# Patient Record
Sex: MALE | Race: WHITE | NOT HISPANIC OR LATINO | Employment: OTHER | URBAN - METROPOLITAN AREA
[De-identification: names, ages, dates, MRNs, and addresses within clinical notes are randomized per-mention and may not be internally consistent; named-entity substitution may affect disease eponyms.]

---

## 2019-08-28 ENCOUNTER — OFFICE VISIT (OUTPATIENT)
Dept: SURGERY | Facility: CLINIC | Age: 54
End: 2019-08-28
Payer: MEDICARE

## 2019-08-28 VITALS
BODY MASS INDEX: 37.37 KG/M2 | DIASTOLIC BLOOD PRESSURE: 100 MMHG | SYSTOLIC BLOOD PRESSURE: 135 MMHG | HEART RATE: 65 BPM | HEIGHT: 73 IN | WEIGHT: 282 LBS

## 2019-08-28 DIAGNOSIS — R15.1 FECAL SMEARING: ICD-10-CM

## 2019-08-28 DIAGNOSIS — R15.0 INCOMPLETE DEFECATION: ICD-10-CM

## 2019-08-28 PROCEDURE — 99999 PR PBB SHADOW E&M-NEW PATIENT-LVL IV: ICD-10-PCS | Mod: PBBFAC,,, | Performed by: COLON & RECTAL SURGERY

## 2019-08-28 PROCEDURE — 46600 DIAGNOSTIC ANOSCOPY SPX: CPT | Mod: 25,PBBFAC | Performed by: COLON & RECTAL SURGERY

## 2019-08-28 PROCEDURE — 99204 OFFICE O/P NEW MOD 45 MIN: CPT | Mod: PBBFAC,25 | Performed by: COLON & RECTAL SURGERY

## 2019-08-28 PROCEDURE — 99203 OFFICE O/P NEW LOW 30 MIN: CPT | Mod: S$PBB,25,, | Performed by: COLON & RECTAL SURGERY

## 2019-08-28 PROCEDURE — 46600 DIAGNOSTIC ANOSCOPY SPX: CPT | Mod: S$PBB,,, | Performed by: COLON & RECTAL SURGERY

## 2019-08-28 PROCEDURE — 99203 PR OFFICE/OUTPT VISIT, NEW, LEVL III, 30-44 MIN: ICD-10-PCS | Mod: S$PBB,25,, | Performed by: COLON & RECTAL SURGERY

## 2019-08-28 PROCEDURE — 99999 PR PBB SHADOW E&M-NEW PATIENT-LVL IV: CPT | Mod: PBBFAC,,, | Performed by: COLON & RECTAL SURGERY

## 2019-08-28 PROCEDURE — 46600 PR DIAG2STIC A2SCOPY: ICD-10-PCS | Mod: S$PBB,,, | Performed by: COLON & RECTAL SURGERY

## 2019-08-28 RX ORDER — GABAPENTIN 600 MG/1
600 TABLET ORAL 3 TIMES DAILY
COMMUNITY

## 2019-08-28 RX ORDER — ARIPIPRAZOLE 5 MG/1
5 TABLET ORAL DAILY
COMMUNITY

## 2019-08-28 RX ORDER — FUROSEMIDE 20 MG/1
20 TABLET ORAL 2 TIMES DAILY
COMMUNITY

## 2019-08-28 RX ORDER — AMLODIPINE BESYLATE 5 MG/1
5 TABLET ORAL DAILY
COMMUNITY

## 2019-08-28 RX ORDER — POTASSIUM CHLORIDE 20 MEQ/1
20 TABLET, EXTENDED RELEASE ORAL 2 TIMES DAILY
COMMUNITY

## 2019-08-28 RX ORDER — ATORVASTATIN CALCIUM 10 MG/1
10 TABLET, FILM COATED ORAL DAILY
COMMUNITY

## 2019-08-28 RX ORDER — HYDROCHLOROTHIAZIDE 25 MG/1
25 TABLET ORAL DAILY
COMMUNITY

## 2019-08-28 RX ORDER — POLYETHYLENE GLYCOL 3350 17 G/17G
POWDER, FOR SOLUTION ORAL
COMMUNITY

## 2019-08-28 RX ORDER — LEVETIRACETAM 500 MG/1
500 TABLET ORAL 2 TIMES DAILY
COMMUNITY

## 2019-08-28 RX ORDER — SERTRALINE HYDROCHLORIDE 100 MG/1
100 TABLET, FILM COATED ORAL DAILY
COMMUNITY

## 2019-08-28 RX ORDER — LORATADINE 10 MG/1
10 TABLET ORAL DAILY
COMMUNITY

## 2019-08-28 NOTE — PATIENT INSTRUCTIONS
Stop taking stool softeners    Take 4 fiber pills daily (2 pills twice a day)    High fiber diet    Apply barrier cream to area around anus as needed.    Return to this clinic in 8 weeks for follow up appointment

## 2019-08-28 NOTE — LETTER
September 5, 2019      Les Layne MD  [address] [facility] [place] [address]           [facility]  [address]  [facility] [address]  Phone: [phone]          Patient: Salinas Orellana   MR Number: [id number]   YOB: 1965   Date of Visit: 8/28/2019       Dear Dr. Les Layne:    Thank you for referring Salinas Orellana to me for evaluation. Attached you will find relevant portions of my assessment and plan of care.    If you have questions, please do not hesitate to call me. I look forward to following Salinas Orellana along with you.    Sincerely,    Max Wheeler  CC:  No Recipients    If you would like to receive this communication electronically, please contact [email or web address] or [phone] to request more information on [employer] access.    For providers and/or their staff who would like to refer a patient to [facility], please contact us through our one-stop-shop provider referral line, [facility] , at [phone].    If you feel you have received this communication in error or would no longer like to receive these types of communications, please e-mail [email or web address]

## 2019-08-28 NOTE — PROGRESS NOTES
CRS Office Visit History and Physical    Referring Md:   Les Layne Md  [address] [facility] [place] [address]    SUBJECTIVE:     Chief Complaint: anal pain    History of Present Illness:  Patient is a 53 y.o. male presents with anal pain  for several months duration. He is a poor historian. He states that he had one hemorrhoid surgery 3 years ago in [place], and another hemorrhoid surgery longer in the past in [place]. His records state that he has also had a sphincterotomy at some point, but we do not have operative reports.    He states that his anal pain is present most of the time, but that it becomes more severe after bowel movements. He takes miralax, which has not helped much. His stools are soft and he doesn't strain too much. Topical creams have not helped pain. Pain is sharp and severe, 9/10 after bowel movements.      Last Colonoscopy: ~ 3 years ago, states no polyps    Mr Ladiner has a complex medical history including a CVA in 2008 with significant residual defecits leading him to be in a wheelchair. He was living independently until earlier this year, when he fell and sustained a hip fracture, and he now is rehabbing in a nursing home.      No past medical history on file.    No past surgical history on file.    Review of patient's allergies indicates:  Allergies not on file    No current outpatient medications on file prior to visit.     No current facility-administered medications on file prior to visit.        No family history on file.    Social History     Tobacco Use    Smoking status: Not on file   Substance Use Topics    Alcohol use: Not on file    Drug use: Not on file        Review of Systems:  ROS:  GENERAL: No fever, chills, fatigability or weight loss.  Integument:  No rashes, redness, icterus  CHEST: Denies MANCUSO, cyanosis, wheezing, cough and sputum production.  CARDIOVASCULAR: Denies chest pain, PND, orthopnea or reduced exercise tolerance.  GI:  Denies  abd pain, dysphagia, nausea, vomiting, no hematemesis, no rectal pain  : Denies burning on urination, no hematuria, no bacteriuria  MSK:  No deformities, swelling, joint pain swelling  Neurologic:  No HAs, seizures, weakness, paresthesias, gait problems      OBJECTIVE:     Vital Signs (Most Recent)  There were no vitals taken for this visit.    Physical Exam:  General: Unknown male in no distress      Anorectal:   Inspection  Excoriated skin but no ulcer or fissure externally. Some fecal residue present at anal verge and perianal skin.                  AGSUTIN:  normal tone, no masses, normal squeeze, normal relaxation with Valsalva  Extremities: Warm dry and intact.  NO CCE  Neuro: alert and oriented x 4.  Moves all extremities.         ASSESSMENT/PLAN:   53 year old male with perianal pain, likely due to skin irritation from fecal residue      Recommend  1. Stop laxatives    2. Increase fiber intake, take either metamucil daily or 4 fiber tablets daily    3. Calmoseptine barrier cream as needed to trey anal area    4. Return to clinic in 8 weeks for follow up.      I have personally obtained a history and performed a physical exam with and independent to my resident and discussed the findings and plan with the patient.  I agree with the above findings and plan with the following exceptions:  None    H, Charly Bell MD, FACS, FASCRS  Staff Surgeon  Dept of Colon and Rectal Surgery  [facility] [place], [place]      Anoscopy Procedure Note:   Verbal consent obtained    Indications:  Perianal pain    Post procedure diagnosis:  Perianal irritation and chemical irritation from liquid stool, Grade I hemorrhoids    Procedure: anoscopy    Surgeon DAYNA    Assistant: Sommovilla    Specimen none    Findings:  Grade 1 hemorrhoids    Right posterior hemorrhoid grade 1  Right anterior hemorrhoid grade 1  Left lateral hemorrhoid grade 1    Pt tolerated procedure well.      No complications.

## 2019-09-25 PROBLEM — R15.1 FECAL SMEARING: Status: ACTIVE | Noted: 2019-09-25

## 2019-09-25 PROBLEM — R15.0 INCOMPLETE DEFECATION: Status: ACTIVE | Noted: 2019-09-25

## 2019-10-04 ENCOUNTER — TELEPHONE (OUTPATIENT)
Dept: SURGERY | Facility: CLINIC | Age: 54
End: 2019-10-04

## 2019-10-09 ENCOUNTER — TELEPHONE (OUTPATIENT)
Dept: SURGERY | Facility: CLINIC | Age: 54
End: 2019-10-09